# Patient Record
Sex: MALE | Race: AMERICAN INDIAN OR ALASKA NATIVE | HISPANIC OR LATINO | URBAN - METROPOLITAN AREA
[De-identification: names, ages, dates, MRNs, and addresses within clinical notes are randomized per-mention and may not be internally consistent; named-entity substitution may affect disease eponyms.]

---

## 2022-03-17 ENCOUNTER — EMERGENCY (EMERGENCY)
Facility: HOSPITAL | Age: 42
LOS: 1 days | Discharge: HOME | End: 2022-03-17
Attending: EMERGENCY MEDICINE | Admitting: EMERGENCY MEDICINE
Payer: COMMERCIAL

## 2022-03-17 VITALS
SYSTOLIC BLOOD PRESSURE: 137 MMHG | HEART RATE: 67 BPM | DIASTOLIC BLOOD PRESSURE: 83 MMHG | RESPIRATION RATE: 18 BRPM | OXYGEN SATURATION: 99 % | TEMPERATURE: 98 F

## 2022-03-17 VITALS
WEIGHT: 195.11 LBS | OXYGEN SATURATION: 100 % | HEART RATE: 86 BPM | TEMPERATURE: 97 F | DIASTOLIC BLOOD PRESSURE: 95 MMHG | RESPIRATION RATE: 16 BRPM | SYSTOLIC BLOOD PRESSURE: 153 MMHG

## 2022-03-17 LAB
FLUAV AG NPH QL: SIGNIFICANT CHANGE UP
FLUBV AG NPH QL: SIGNIFICANT CHANGE UP
RSV RNA NPH QL NAA+NON-PROBE: SIGNIFICANT CHANGE UP
SARS-COV-2 RNA SPEC QL NAA+PROBE: SIGNIFICANT CHANGE UP

## 2022-03-17 PROCEDURE — 99284 EMERGENCY DEPT VISIT MOD MDM: CPT | Mod: 25

## 2022-03-17 PROCEDURE — 70450 CT HEAD/BRAIN W/O DYE: CPT | Mod: MA

## 2022-03-17 PROCEDURE — 70450 CT HEAD/BRAIN W/O DYE: CPT | Mod: 26,MA

## 2022-03-17 PROCEDURE — 87637 SARSCOV2&INF A&B&RSV AMP PRB: CPT

## 2022-03-17 PROCEDURE — 99284 EMERGENCY DEPT VISIT MOD MDM: CPT

## 2022-03-17 RX ORDER — IBUPROFEN 200 MG
600 TABLET ORAL ONCE
Refills: 0 | Status: COMPLETED | OUTPATIENT
Start: 2022-03-17 | End: 2022-03-17

## 2022-03-17 RX ORDER — METOCLOPRAMIDE HCL 10 MG
10 TABLET ORAL ONCE
Refills: 0 | Status: COMPLETED | OUTPATIENT
Start: 2022-03-17 | End: 2022-03-17

## 2022-03-17 RX ADMIN — Medication 10 MILLIGRAM(S): at 16:23

## 2022-03-17 RX ADMIN — Medication 600 MILLIGRAM(S): at 16:23

## 2022-03-17 NOTE — ED PROVIDER NOTE - CLINICAL SUMMARY MEDICAL DECISION MAKING FREE TEXT BOX
Pt p/w HA, generalized. No focal neuro complaints or deficits. DDx includes tension, cluster, less likely migraine, mass / lesion, other HA NOS. Very low suspicion SAH based on H&P. Pt in no apparent distress. Palpated cyst superficial and unlikely cause. Given new-onset HA's, will get CTH. Analgesia. Advised f/u PCP, neuro.

## 2022-03-17 NOTE — ED PROVIDER NOTE - OBJECTIVE STATEMENT
Pt w/ no sig PMHx, no PSHx p/w HA x 6 days. HA is generalized, pt cannot describe the pain. The pain is constant and not relieved w/ Tylenol. No associated n/v, f/c, vision disturbances, numbness/tingling, weakness. No trauma. He states he does not typically get HA's. He has noticed a lump on his head several months ago, told it was a cyst. He  is concerned it is related. He states his father had a lot of cysts in his head - he cannot describe further, but states his father did not have CA, ICH, nor aneurysm, and is still living. No URI sx. He also reports R eye discomfort. No throat pain. No loss taste/smell. No myalgias. He is vaccinated against COVID19. Pt reports 10/10 but appears comfortable and is no apparent distress

## 2022-03-17 NOTE — ED PROVIDER NOTE - NSFOLLOWUPINSTRUCTIONS_ED_ALL_ED_FT
You were evaluated in the ED (Emergency Department) for a headache. You had a CT of the brain which showed no abnormalities to the brain. You have some inflammation in your sinuses, but no infectious symptoms. You should follow up with your primary medical doctor. If you continue to have headaches, you should see a neurologist. The cyst you feel in your scalp is under the skin, and not adjacent to the brain, and does not require treatment.     You may take over-the-counter Ibuprofen (Motrin or Advil), or Excedrin for pain. You can also apply warm compresses to the area of the cyst.     Fue evaluado en el ED (Departamento de Emergencia) por un dolor de deja. Le hicieron tomi tomografía computarizada del cerebro que no mostró anomalías en el cerebro. Tiene algo de inflamación en los senos paranasales, john no tiene síntomas infecciosos. Debe hacer un seguimiento con xiong médico de cabecera. Si continúa teniendo carlos de deja, debe consultar a un neurólogo. El quiste que siente en el cuero cabelludo está debajo de la piel, no adyacente al cerebro y no requiere tratamiento.    Puede sage ibuprofeno de venta vick (Motrin o Advil) o Excedrin para el dolor. También puede aplicar compresas tibias en el área del quiste.

## 2022-03-17 NOTE — ED PROVIDER NOTE - NSFOLLOWUPCLINICS_GEN_ALL_ED_FT
[facility]  Neurology  [address] [place] [address]  Phone: [phone]  Fax: [phone]

## 2022-03-17 NOTE — ED PROVIDER NOTE - PHYSICAL EXAMINATION
Constitutional: Well appearing, awake, alert, oriented to person, place, time/situation and in no apparent distress.  Head atraumatic, normocephalic. Palpated 1 cm superficial scalp lesion to L posterior parietal / occipital region, non tender, fluctuance. no overlying skin changes  ENMT: Airway patent. Normal MM. TMI b/l  Eyes: Clear bilaterally, PERRL, EOMI. no photophobia  Cardiac: Normal rate, regular rhythm.  Heart sounds S1, S2.  No murmurs, rubs or gallops.  Respiratory: Breaths sounds equal and clear b/l. No increased WOB, tachypnea, hypoxia, or accessory mm use. Pt speaks in full sentences.   Musculoskeletal: Range of motion is not limited. Neck supple, FROM  Neuro: Alert & Oriented x 3. CN II-XII intact. No facial droop. Clear speech. SOLIMAN w/ 5/5 strength x 4 ext. Normal sensation. No pronator drift. Normal gait  Skin: Skin normal color for race, warm, dry and intact. No evidence of rash.  Psych: Alert and oriented to person, place, time/situation. normal mood and affect. no apparent risk to self or others.

## 2022-03-17 NOTE — ED ADULT TRIAGE NOTE - CHIEF COMPLAINT QUOTE
Pt c/o headache x6 days and "bump" to the back of the head x months. Denies falls, injuries, vision changes, vomiting. Ambulating with steady gait. Taking Tylenol with no relief.

## 2022-03-17 NOTE — ED ADULT NURSE NOTE - OBJECTIVE STATEMENT
Patient presents AOX4 c/o L sided HA x 6-7 days. Reports he had his glasses changed x 1 month ago. Denies fevers/chills/nausea/vomiting. Reports lump to posterior head-- reports he saw a doctor and was told he has a cyst. Speaking in full, complete sentences. No slurred speech noted. Answering questions and following verbal commands appropriately.

## 2022-03-17 NOTE — ED PROVIDER NOTE - NS ED ROS FT
Constitutional: No fever or chills.   Eyes: No pain, blurry vision, or discharge.  ENMT: No hearing changes, pain, discharge or infections. No neck pain or stiffness.  Cardiac: No chest pain, SOB or edema. No chest pain with exertion.  Respiratory: No cough or respiratory distress. No hemoptysis. No history of asthma or RAD.  GI: No nausea, vomiting, diarrhea or abdominal pain.  : No dysuria, frequency or burning.  MS: No myalgia, muscle weakness, joint pain or back pain.  Neuro: No focal weakness. No LOC.  Skin: No skin rash.   Endocrine: No history of thyroid disease or diabetes.  Except as documented in the HPI, all other systems are negative.

## 2022-03-17 NOTE — ED PROVIDER NOTE - PROGRESS NOTE DETAILS
D/w pt negative CTH except for sinus inflammation. No f/c, purulent discharge or facial pain to necessitate tx. Advised f/u PCP, and neuro for recurrent HA's.

## 2022-03-17 NOTE — ED PROVIDER NOTE - PATIENT PORTAL LINK FT
You can access the FollowMyHealth Patient Portal offered by [facility] by registering at the following website: [email or web address]. By joining [facility]’s FollowMyHealth portal, you will also be able to view your health information using other applications (apps) compatible with our system.

## 2022-03-18 PROBLEM — Z00.00 ENCOUNTER FOR PREVENTIVE HEALTH EXAMINATION: Status: ACTIVE | Noted: 2022-03-18

## 2022-03-21 DIAGNOSIS — Z20.822 CONTACT WITH AND (SUSPECTED) EXPOSURE TO COVID-19: ICD-10-CM

## 2022-03-21 DIAGNOSIS — R51.9 HEADACHE, UNSPECIFIED: ICD-10-CM

## 2022-04-01 ENCOUNTER — NON-APPOINTMENT (OUTPATIENT)
Age: 42
End: 2022-04-01

## 2022-04-01 ENCOUNTER — APPOINTMENT (OUTPATIENT)
Dept: NEUROLOGY | Facility: CLINIC | Age: 42
End: 2022-04-01
Payer: COMMERCIAL

## 2022-04-01 VITALS
OXYGEN SATURATION: 98 % | HEART RATE: 76 BPM | HEIGHT: 71 IN | TEMPERATURE: 97.7 F | WEIGHT: 197 LBS | DIASTOLIC BLOOD PRESSURE: 88 MMHG | BODY MASS INDEX: 27.58 KG/M2 | SYSTOLIC BLOOD PRESSURE: 132 MMHG

## 2022-04-01 DIAGNOSIS — G44.209 TENSION-TYPE HEADACHE, UNSPECIFIED, NOT INTRACTABLE: ICD-10-CM

## 2022-04-01 PROCEDURE — 99204 OFFICE O/P NEW MOD 45 MIN: CPT

## 2022-04-01 RX ORDER — IBUPROFEN 600 MG/1
600 TABLET, FILM COATED ORAL
Refills: 0 | Status: ACTIVE | COMMUNITY

## 2022-04-01 NOTE — HISTORY OF PRESENT ILLNESS
[FreeTextEntry1] : Presented to ED 3/17 with severe headache and referred by ED for neurology evaluation.\par \par Reports that he has pain in the middle of his forehead, above his nose, and behind his eyes, extending up to the top and back of his head.  No vision changes, photophobia, phonophobia, nausea, vomiting.  Has been taking Tylenol with helps temporarily, but the pain always comes back.  Does not get enough sleep, especially on Fridays, Saturdays, and Sundays when he works until midnight.\par

## 2022-04-01 NOTE — DATA REVIEWED
[de-identified] : Head CT 3.17.2022 independently reviewed and reviewed with patient, unremarkable

## 2022-04-01 NOTE — ASSESSMENT
[FreeTextEntry1] : Tension headache, now with medication overuse component\par \par -Short course of nortriptyline 10 mg QHS to help wean off NSAIDs\par -Ibuprofen max 2-3x/week\par -Importance of sleep reviewed and advised catching up on sleep on days off\par \par RTC PRN

## 2022-04-01 NOTE — PHYSICAL EXAM
[FreeTextEntry1] : Physical Exam\par Constitutional: no apparent distress\par Psychiatric: normal affect, euthymic, alert and oriented x 3\par \par Neurologic Exam:\par Mental Status: awake and alert, speech fluent and prosodic with no paraphasic errors\par Cranial Nerves: I: deferred; II: pupils equal round and reactive III, IV, VI: extraocular movements full with no nystagmus; V: facial sensation intact and symmetric; VII: facial power symmetric; VIII: hearing intact to voice; IX/X: palate elevates symmetrically, no dysarthria; XI: shoulder shrug symmetric; XII: tongue protrudes midline\par Motor: normal bulk and tone, no orbiting or pronator drift, power 5/5 to confrontation throughout including shoulder abduction, elbow flexion and extension, wrist flexion and extension, hip flexion, knee flexion and extension, no abnormal movements\par Sensation: intact to light touch in distal upper and lower extremities bilaterally\par Coordination: finger-nose-finger intact bilaterally\par Reflexes: 2+ biceps, triceps, brachioradialis, patella\par Gait: narrow base, normal stance and stride, normal arm swing\par

## 2022-04-11 ENCOUNTER — APPOINTMENT (OUTPATIENT)
Dept: NEUROLOGY | Facility: CLINIC | Age: 42
End: 2022-04-11
Payer: COMMERCIAL

## 2022-04-11 ENCOUNTER — APPOINTMENT (OUTPATIENT)
Dept: OTOLARYNGOLOGY | Facility: CLINIC | Age: 42
End: 2022-04-11
Payer: COMMERCIAL

## 2022-04-11 VITALS
SYSTOLIC BLOOD PRESSURE: 143 MMHG | HEIGHT: 71 IN | DIASTOLIC BLOOD PRESSURE: 85 MMHG | TEMPERATURE: 97.7 F | HEART RATE: 82 BPM | BODY MASS INDEX: 27.58 KG/M2 | WEIGHT: 197 LBS

## 2022-04-11 PROCEDURE — 31231 NASAL ENDOSCOPY DX: CPT

## 2022-04-11 PROCEDURE — 99204 OFFICE O/P NEW MOD 45 MIN: CPT | Mod: 25

## 2022-04-11 PROCEDURE — 99442: CPT | Mod: 95

## 2022-04-11 RX ORDER — FLUTICASONE PROPIONATE 50 UG/1
50 SPRAY, METERED NASAL DAILY
Qty: 1 | Refills: 2 | Status: ACTIVE | COMMUNITY
Start: 2022-04-11 | End: 1900-01-01

## 2022-04-11 RX ORDER — FLUTICASONE PROPIONATE 50 UG/1
50 SPRAY, METERED NASAL DAILY
Qty: 1 | Refills: 3 | Status: ACTIVE | COMMUNITY
Start: 2022-04-11 | End: 1900-01-01

## 2022-04-11 RX ORDER — NORTRIPTYLINE HYDROCHLORIDE 10 MG/1
10 CAPSULE ORAL
Qty: 30 | Refills: 1 | Status: DISCONTINUED | COMMUNITY
Start: 2022-04-01 | End: 2022-04-11

## 2022-04-11 NOTE — PHYSICAL EXAM
[Nasal Endoscopy Performed] : nasal endoscopy was performed, see procedure section for findings [Midline] : trachea located in midline position [Normal] : no rashes [de-identified] : hypertrophied, worse on left [de-identified] : +

## 2022-04-11 NOTE — ASSESSMENT
[FreeTextEntry1] : Headaches\par \par I initially suspected that these were tension headaches, but I am now more concerned about sinus headaches/possible chronic sinusitis.\par Stop nortriptyline as this is not helping\par Okay to continue ibuprofen for now\par Start Flonase \par ENT referral

## 2022-04-11 NOTE — PROCEDURE
[FreeTextEntry6] : Nasal Endoscopy Procedure Note\par \par Pre-operative Diagnosis: nasal congestion, forehead/occiput pressure\par Post-operative Diagnosis: acute sinusitis\par Anesthesia: Topical\par Procedure: Bilateral nasal endoscopy\par  \par Procedure Details: \par After topical anesthesia and decongestant, the patient was placed in the supine position. The telescope was passed along the left nasal floor to the nasopharynx. It was then passed into the region of the middle meatus, middle turbinate, and the sphenoethmoid region. An identical procedure was performed on the right side. \par  \par Findings: \par Mucosa: 	 mild inflammation\par Nasal septum: normal	\par Discharge: 	clear \par Turbinates: 	hypertrophied\par Adenoid: 	 normal	\par Posterior choanae: 	normal	\par Eustachian tubes: 	normal	\par Mucous stranding: 	normal 	\par Lesions: 	 Not present	\par  \par Comments: \par Condition: Stable. Patient tolerated procedure well.\par

## 2022-04-11 NOTE — HISTORY OF PRESENT ILLNESS
[de-identified] : 41 year old male presents with complaints of forehead and occipital pain/pressure for 1 month and nasal congestion for 3 weeks.  Yesterday when he cleaned his nose with a tissue, he noticed it "had a bad odor." He does complain of a diminished sense of taste and smell for 1 week.  No drainage that he has noticed. No URI, fevers, or dental pain. His symptoms are intermittent throughout the day, worse in the evenings. No other ENT issues. \par \par He went to the ER about 1 month ago because of his headache. They did head CT and was + for mucosal thickening of ethmoid and sphenoid sinuses and also was given a Zpak. Referred to neurology. He was seen and started on Nortriptyline for tension headache, but this is now discontinued. Neurology referred him to us to evaluate possible sinusitis.

## 2022-04-11 NOTE — ASSESSMENT
[FreeTextEntry1] : 41 year old male presents with 1 month of forehead and occipital pain as well as 3 weeks of nasal congestion. About 1 month ago, his symptoms brought him to the ER where a CT head was performed showing mucosal thickening ethmoid and sphenoid sinuses. On exam, there is evidence of bilateral turbinate hypertrophy, left worse than right. His history, exam findings, and imaging studies are consistent with acute sinusitis. I recommend nasal saline twice a day and nasal steroids once a day. In addition, we will order Augmentin for 10 days as well as a Medrol Dosepak. He will follow up with us in 3 weeks or sooner if needed. If his symptoms persists or fail to improve, we will obtain a dedicated CT sinus. \par \par \par \par Plan:\par - nasal saline and nasal steroids\par - Augmentin BID x 10 days\par - Medrol Dospak x 7 days\par - fu in 3 weeks, CT sinus if still symptomatic.

## 2022-04-11 NOTE — HISTORY OF PRESENT ILLNESS
[FreeTextEntry1] : Pain has persisted, nortriptyline not helping at all.  Pain is now mostly in the forehead and nose, feels very congested.  No fevers.  Wants to know if there is anything he can take to help with the congestion.

## 2022-04-11 NOTE — DATA REVIEWED
[de-identified] : CT Head 3/17/22\par Impression:\par No acute intracranial hemorrhage or mass effect.\par Mucosal thickening ethmoid and sphenoid sinuses.

## 2023-01-25 ENCOUNTER — APPOINTMENT (OUTPATIENT)
Dept: ORTHOPEDIC SURGERY | Facility: CLINIC | Age: 43
End: 2023-01-25
Payer: COMMERCIAL

## 2023-01-25 VITALS
HEIGHT: 71 IN | DIASTOLIC BLOOD PRESSURE: 92 MMHG | HEART RATE: 73 BPM | OXYGEN SATURATION: 98 % | SYSTOLIC BLOOD PRESSURE: 133 MMHG | WEIGHT: 197 LBS | BODY MASS INDEX: 27.58 KG/M2

## 2023-01-25 DIAGNOSIS — M54.50 LOW BACK PAIN, UNSPECIFIED: ICD-10-CM

## 2023-01-25 PROCEDURE — 99203 OFFICE O/P NEW LOW 30 MIN: CPT

## 2023-01-25 PROCEDURE — 72083 X-RAY EXAM ENTIRE SPI 4/5 VW: CPT

## 2023-01-27 PROBLEM — M54.50 LOWER BACK PAIN: Status: ACTIVE | Noted: 2023-01-26

## 2023-01-27 NOTE — DISCUSSION/SUMMARY
[de-identified] : Diagnosis: Lower back pain, possible lumbar radiculopathy.\par \par I discussed my findings with the patient. The patient has failed conservative treatment with ibuprofen 600 mg at a prescription dose for an extended period of time. As a result, given his significant symptoms, I have recommended further workup with a MRI Lumbar Spine. He should follow up after this is performed to review and determine plan at that time.

## 2023-01-27 NOTE — REASON FOR VISIT
[Initial Visit] : an initial visit for [Back Pain] : back pain [Interpreters_IDNumber] : [id number] [Interpreters_FullName] : Naveed [TWNoteComboBox1] : [place]

## 2023-01-27 NOTE — HISTORY OF PRESENT ILLNESS
September 3, 2021         Patient: Eduar Stephens   YOB: 2014   Date of Visit: 9/3/2021           To Whom it May Concern:    Eduar tSephens was seen in my clinic on 9/3/2021. He may return to school on 9/7/21.    If you have any questions or concerns, please don't hesitate to call.        Sincerely,           Lennie Field M.D.  Electronically Signed      [de-identified] : Initial Visit 01/24/2023: Mr. Levin reports a longstanding history of low back pain that radiates to his bilateral posterior thighs and circumferentially around his knees. Pain occasionally radiates distally to his calves. He reports significant difficulty ambulating with these symptoms. He has been doing a home exercise program and taking oral NSAIDs without any significant relief.

## 2023-01-27 NOTE — END OF VISIT
[FreeTextEntry3] : All medical record entries made by the Scribe were at my, Dr. Balwinder Ochoa, direction and personally dictated by me on 01/25/2023. I have reviewed the chart and agree that the record accurately reflects my personal performance of the history, physical exam, assessment and plan. I have also personally directed, reviewed, and agreed with the chart.

## 2023-01-27 NOTE — PHYSICAL EXAM
[de-identified] : Physical Exam:\par \par General: patient is well developed, well nourished, in no acute\par distress, alert and oriented x 3.\par \par Mood and affect: normal\par \par Respiratory: no respiratory distress noted\par \par Skin: no scars over spine, skin intact, no erythema, increased warmth\par \par Alignment: The spine is well compensated in the coronal and sagittal plane.\par \par Gait: The patient is able to toe walk and heel walk without difficulty. The patient is able to tandem gait without difficulty.\par \par Palpation: no tenderness to palpation spine or paraspinal region\par \par Range of motion: Lumbar spine ROM is restricted.\par \par Neurologic Exam:\par Motor: Manual Muscle testing in the lower extremities is 5 out of 5 in all muscle groups. There is no evidence of muscular\par atrophy in the lower extremities. Sensory: Sensation to light touch is grossly intact in the lower extremities\par \par Reflexes: DTR are present and symmetric throughout, no clonus, plantar responses are flexor\par \par Hip Exam: No pain with internal or external rotation of hips bilaterally\par \par Special tests: Straight leg raise test negative. Cross straight leg test negative. JAKY test negative\par \par Vascular: Examination of the peripheral vascular system demonstrates no evidence of congestion or edema. no evidence of\par lymphedema bilateral lower extremities, pulses are present and symmetric in both lower extremities. [de-identified] : XR Full Spine AP/Lateral with Lumbar flexion/extension 01/25/2023 [my read]: No significant disc pathology. No instability. No fractures. Hips do not reveal significant arthrosis.

## 2023-01-27 NOTE — ADDENDUM
[FreeTextEntry1] : Documented by Chrissie Castañeda acting as a scribe for Dr. Balwinder Ochoa on 01/26/2023.

## 2023-02-13 ENCOUNTER — APPOINTMENT (OUTPATIENT)
Dept: MRI IMAGING | Facility: HOSPITAL | Age: 43
End: 2023-02-13
Payer: COMMERCIAL

## 2023-02-13 ENCOUNTER — RESULT REVIEW (OUTPATIENT)
Age: 43
End: 2023-02-13

## 2023-02-13 ENCOUNTER — OUTPATIENT (OUTPATIENT)
Dept: OUTPATIENT SERVICES | Facility: HOSPITAL | Age: 43
LOS: 1 days | End: 2023-02-13
Payer: COMMERCIAL

## 2023-02-13 PROCEDURE — 72148 MRI LUMBAR SPINE W/O DYE: CPT

## 2023-02-13 PROCEDURE — 72148 MRI LUMBAR SPINE W/O DYE: CPT | Mod: 26

## 2023-02-15 ENCOUNTER — APPOINTMENT (OUTPATIENT)
Dept: ORTHOPEDIC SURGERY | Facility: CLINIC | Age: 43
End: 2023-02-15
Payer: COMMERCIAL

## 2023-02-15 VITALS
HEIGHT: 71 IN | HEART RATE: 75 BPM | OXYGEN SATURATION: 99 % | SYSTOLIC BLOOD PRESSURE: 152 MMHG | BODY MASS INDEX: 27.58 KG/M2 | DIASTOLIC BLOOD PRESSURE: 98 MMHG | WEIGHT: 197 LBS

## 2023-02-15 DIAGNOSIS — M54.16 RADICULOPATHY, LUMBAR REGION: ICD-10-CM

## 2023-02-15 DIAGNOSIS — M54.6 PAIN IN THORACIC SPINE: ICD-10-CM

## 2023-02-15 PROCEDURE — 99215 OFFICE O/P EST HI 40 MIN: CPT

## 2023-02-15 RX ORDER — MELOXICAM 7.5 MG/1
7.5 TABLET ORAL TWICE DAILY
Qty: 30 | Refills: 1 | Status: ACTIVE | COMMUNITY
Start: 2023-02-15 | End: 1900-01-01

## 2023-02-15 NOTE — HISTORY OF PRESENT ILLNESS
[de-identified] : Bassam Trejo follows up with me at the request of my partner Dr. Ochoa the results of his lumbar MRI.  He again describes ongoing symptoms of low back pain with occasional radiation to the posterior left leg which been present for many years.  He also describes some discomfort regarding the right knee.  Additionally describes some aching pain and stiffness in the mid back without radiation to the upper extremities.  Currently works manual labor and feels that the symptoms have made this more difficult recently.

## 2023-02-15 NOTE — DISCUSSION/SUMMARY
[de-identified] : There are conversations held the patient regarding his condition the natural history all treatment options risk benefits and alternatives.  Given the very active manual nature of his job I recommended a formal course of physical therapy to allow for some manual treatment as well as exercise based intervention regarding his multiple muscular type complaints.  I reassured him that his lumbar spine structurally appears to be in fine condition without clear evidence of pathology that could explain his current symptoms.  I also prescribed meloxicam as an anti-inflammatory alternative to Advil for symptomatic relief.  He will follow-up with Dr. Ochoa on an as-needed basis\par \par I have spent greater than 45 minutes preparing to see the patient, collecting relevant history, performing a thorough history and physical examination, counseling the patient regarding my findings ordering the appropriate therapies and tests, communicating with other relevant healthcare professionals, documenting my encounter and coordinating care.\par

## 2023-02-15 NOTE — PHYSICAL EXAM
[Normal] : Gait: normal [UE/LE] : Sensory: Intact in bilateral upper & lower extremities [de-identified] : MRI lumbar spine 2/13/2023 reviewed in care extreme:\par Relative maintenance of lumbar lordosis.  Minimal early degenerative change L5-S1 with maintenance of disc base height.  There is a relatively small left-sided paracentral disc bulge L5-S1 without significant stenosis.

## 2023-02-15 NOTE — ASSESSMENT
[FreeTextEntry1] : 42-year-old male with chronic low back and bilateral leg pain without evidence of compressive pathology on lumbar MRI, also with cervical thoracic muscular discomfort without clinical evidence of cervical radiculopathy or myelopathy

## 2023-05-02 ENCOUNTER — EMERGENCY (EMERGENCY)
Facility: HOSPITAL | Age: 43
LOS: 1 days | Discharge: HOME | End: 2023-05-02
Attending: EMERGENCY MEDICINE | Admitting: EMERGENCY MEDICINE
Payer: COMMERCIAL

## 2023-05-02 VITALS
OXYGEN SATURATION: 98 % | WEIGHT: 194.01 LBS | HEART RATE: 73 BPM | TEMPERATURE: 99 F | HEIGHT: 71 IN | RESPIRATION RATE: 18 BRPM | SYSTOLIC BLOOD PRESSURE: 137 MMHG | DIASTOLIC BLOOD PRESSURE: 85 MMHG

## 2023-05-02 VITALS
SYSTOLIC BLOOD PRESSURE: 129 MMHG | RESPIRATION RATE: 18 BRPM | OXYGEN SATURATION: 98 % | HEART RATE: 75 BPM | TEMPERATURE: 99 F | DIASTOLIC BLOOD PRESSURE: 81 MMHG

## 2023-05-02 DIAGNOSIS — Z87.19 PERSONAL HISTORY OF OTHER DISEASES OF THE DIGESTIVE SYSTEM: ICD-10-CM

## 2023-05-02 DIAGNOSIS — R11.0 NAUSEA: ICD-10-CM

## 2023-05-02 DIAGNOSIS — R10.13 EPIGASTRIC PAIN: ICD-10-CM

## 2023-05-02 LAB
ALBUMIN SERPL ELPH-MCNC: 4.6 G/DL — SIGNIFICANT CHANGE UP (ref 3.3–5)
ALP SERPL-CCNC: 88 U/L — SIGNIFICANT CHANGE UP (ref 40–120)
ALT FLD-CCNC: 24 U/L — SIGNIFICANT CHANGE UP (ref 10–45)
ANION GAP SERPL CALC-SCNC: 9 MMOL/L — SIGNIFICANT CHANGE UP (ref 5–17)
APPEARANCE UR: CLEAR — SIGNIFICANT CHANGE UP
AST SERPL-CCNC: 19 U/L — SIGNIFICANT CHANGE UP (ref 10–40)
BASOPHILS # BLD AUTO: 0.04 K/UL — SIGNIFICANT CHANGE UP (ref 0–0.2)
BASOPHILS NFR BLD AUTO: 1.1 % — SIGNIFICANT CHANGE UP (ref 0–2)
BILIRUB SERPL-MCNC: 0.3 MG/DL — SIGNIFICANT CHANGE UP (ref 0.2–1.2)
BILIRUB UR-MCNC: NEGATIVE — SIGNIFICANT CHANGE UP
BUN SERPL-MCNC: 11 MG/DL — SIGNIFICANT CHANGE UP (ref 7–23)
CALCIUM SERPL-MCNC: 9.4 MG/DL — SIGNIFICANT CHANGE UP (ref 8.4–10.5)
CHLORIDE SERPL-SCNC: 104 MMOL/L — SIGNIFICANT CHANGE UP (ref 96–108)
CO2 SERPL-SCNC: 27 MMOL/L — SIGNIFICANT CHANGE UP (ref 22–31)
COLOR SPEC: YELLOW — SIGNIFICANT CHANGE UP
CREAT SERPL-MCNC: 1.08 MG/DL — SIGNIFICANT CHANGE UP (ref 0.5–1.3)
DIFF PNL FLD: NEGATIVE — SIGNIFICANT CHANGE UP
EGFR: 88 ML/MIN/1.73M2 — SIGNIFICANT CHANGE UP
EOSINOPHIL # BLD AUTO: 0.2 K/UL — SIGNIFICANT CHANGE UP (ref 0–0.5)
EOSINOPHIL NFR BLD AUTO: 5.7 % — SIGNIFICANT CHANGE UP (ref 0–6)
GLUCOSE SERPL-MCNC: 124 MG/DL — HIGH (ref 70–99)
GLUCOSE UR QL: NEGATIVE — SIGNIFICANT CHANGE UP
HCT VFR BLD CALC: 45.4 % — SIGNIFICANT CHANGE UP (ref 39–50)
HGB BLD-MCNC: 15.7 G/DL — SIGNIFICANT CHANGE UP (ref 13–17)
IMM GRANULOCYTES NFR BLD AUTO: 0.3 % — SIGNIFICANT CHANGE UP (ref 0–0.9)
KETONES UR-MCNC: NEGATIVE — SIGNIFICANT CHANGE UP
LEUKOCYTE ESTERASE UR-ACNC: NEGATIVE — SIGNIFICANT CHANGE UP
LIDOCAIN IGE QN: 46 U/L — SIGNIFICANT CHANGE UP (ref 7–60)
LYMPHOCYTES # BLD AUTO: 1.31 K/UL — SIGNIFICANT CHANGE UP (ref 1–3.3)
LYMPHOCYTES # BLD AUTO: 37.4 % — SIGNIFICANT CHANGE UP (ref 13–44)
MCHC RBC-ENTMCNC: 30.9 PG — SIGNIFICANT CHANGE UP (ref 27–34)
MCHC RBC-ENTMCNC: 34.6 GM/DL — SIGNIFICANT CHANGE UP (ref 32–36)
MCV RBC AUTO: 89.4 FL — SIGNIFICANT CHANGE UP (ref 80–100)
MONOCYTES # BLD AUTO: 0.2 K/UL — SIGNIFICANT CHANGE UP (ref 0–0.9)
MONOCYTES NFR BLD AUTO: 5.7 % — SIGNIFICANT CHANGE UP (ref 2–14)
NEUTROPHILS # BLD AUTO: 1.74 K/UL — LOW (ref 1.8–7.4)
NEUTROPHILS NFR BLD AUTO: 49.8 % — SIGNIFICANT CHANGE UP (ref 43–77)
NITRITE UR-MCNC: NEGATIVE — SIGNIFICANT CHANGE UP
NRBC # BLD: 0 /100 WBCS — SIGNIFICANT CHANGE UP (ref 0–0)
PH UR: 6.5 — SIGNIFICANT CHANGE UP (ref 5–8)
PLATELET # BLD AUTO: 308 K/UL — SIGNIFICANT CHANGE UP (ref 150–400)
POTASSIUM SERPL-MCNC: 4.5 MMOL/L — SIGNIFICANT CHANGE UP (ref 3.5–5.3)
POTASSIUM SERPL-SCNC: 4.5 MMOL/L — SIGNIFICANT CHANGE UP (ref 3.5–5.3)
PROT SERPL-MCNC: 7.2 G/DL — SIGNIFICANT CHANGE UP (ref 6–8.3)
PROT UR-MCNC: NEGATIVE MG/DL — SIGNIFICANT CHANGE UP
RBC # BLD: 5.08 M/UL — SIGNIFICANT CHANGE UP (ref 4.2–5.8)
RBC # FLD: 11.5 % — SIGNIFICANT CHANGE UP (ref 10.3–14.5)
SODIUM SERPL-SCNC: 140 MMOL/L — SIGNIFICANT CHANGE UP (ref 135–145)
SP GR SPEC: 1.02 — SIGNIFICANT CHANGE UP (ref 1–1.03)
UROBILINOGEN FLD QL: 0.2 E.U./DL — SIGNIFICANT CHANGE UP
WBC # BLD: 3.5 K/UL — LOW (ref 3.8–10.5)
WBC # FLD AUTO: 3.5 K/UL — LOW (ref 3.8–10.5)

## 2023-05-02 PROCEDURE — 85025 COMPLETE CBC W/AUTO DIFF WBC: CPT

## 2023-05-02 PROCEDURE — 96374 THER/PROPH/DIAG INJ IV PUSH: CPT | Mod: XU

## 2023-05-02 PROCEDURE — 96375 TX/PRO/DX INJ NEW DRUG ADDON: CPT

## 2023-05-02 PROCEDURE — 99284 EMERGENCY DEPT VISIT MOD MDM: CPT

## 2023-05-02 PROCEDURE — 99284 EMERGENCY DEPT VISIT MOD MDM: CPT | Mod: 25

## 2023-05-02 PROCEDURE — 80053 COMPREHEN METABOLIC PANEL: CPT

## 2023-05-02 PROCEDURE — 81003 URINALYSIS AUTO W/O SCOPE: CPT

## 2023-05-02 PROCEDURE — 74177 CT ABD & PELVIS W/CONTRAST: CPT | Mod: 26,MA

## 2023-05-02 PROCEDURE — 83690 ASSAY OF LIPASE: CPT

## 2023-05-02 PROCEDURE — 36415 COLL VENOUS BLD VENIPUNCTURE: CPT

## 2023-05-02 PROCEDURE — 74177 CT ABD & PELVIS W/CONTRAST: CPT | Mod: MA

## 2023-05-02 RX ORDER — ONDANSETRON 8 MG/1
4 TABLET, FILM COATED ORAL ONCE
Refills: 0 | Status: COMPLETED | OUTPATIENT
Start: 2023-05-02 | End: 2023-05-02

## 2023-05-02 RX ORDER — LIDOCAINE 4 G/100G
10 CREAM TOPICAL ONCE
Refills: 0 | Status: COMPLETED | OUTPATIENT
Start: 2023-05-02 | End: 2023-05-02

## 2023-05-02 RX ORDER — FAMOTIDINE 10 MG/ML
1 INJECTION INTRAVENOUS
Qty: 14 | Refills: 0
Start: 2023-05-02 | End: 2023-05-15

## 2023-05-02 RX ORDER — FAMOTIDINE 10 MG/ML
20 INJECTION INTRAVENOUS ONCE
Refills: 0 | Status: COMPLETED | OUTPATIENT
Start: 2023-05-02 | End: 2023-05-02

## 2023-05-02 RX ORDER — SODIUM CHLORIDE 9 MG/ML
1000 INJECTION INTRAMUSCULAR; INTRAVENOUS; SUBCUTANEOUS ONCE
Refills: 0 | Status: COMPLETED | OUTPATIENT
Start: 2023-05-02 | End: 2023-05-02

## 2023-05-02 RX ADMIN — LIDOCAINE 10 MILLILITER(S): 4 CREAM TOPICAL at 19:02

## 2023-05-02 RX ADMIN — Medication 30 MILLILITER(S): at 19:02

## 2023-05-02 RX ADMIN — FAMOTIDINE 20 MILLIGRAM(S): 10 INJECTION INTRAVENOUS at 18:08

## 2023-05-02 RX ADMIN — ONDANSETRON 4 MILLIGRAM(S): 8 TABLET, FILM COATED ORAL at 18:07

## 2023-05-02 RX ADMIN — SODIUM CHLORIDE 1000 MILLILITER(S): 9 INJECTION INTRAMUSCULAR; INTRAVENOUS; SUBCUTANEOUS at 18:04

## 2023-05-02 NOTE — ED ADULT NURSE NOTE - OBJECTIVE STATEMENT
Patient a+o x4 c/o epigastric pain x 3 days, states occurs after eating. Has taken otc medication with no relief. Maintaining patent airway, denies sob/cp/dizziness/n/v/fever/chills.

## 2023-05-02 NOTE — ED ADULT NURSE NOTE - CHIEF COMPLAINT QUOTE
Pt Indonesian speaking only reports epigastric pain along w decreased PO, diarrhea, and N/V since 3 days. no PMH

## 2023-05-02 NOTE — ED PROVIDER NOTE - CLINICAL SUMMARY MEDICAL DECISION MAKING FREE TEXT BOX
43 M pmh gerd p/w epigastric abd pain x 3 weeks, worse the past few days.  taking nexium w/ out relief.  on exam vss, comfortable appearing, lungs ctab, hrrr, + epigastric ttp, no r/g, no cvat.  likely gastritis vs PUD vs pancreatitis, consider biliary colic.  will obtain labs, CT a/p and give GI cocktail    labs wnl,  CT  shows no acute pathology.  pt feeling better s/p GI cocktail.  will dc w/ pepcid, educated again on diet changes and pt has GI he can f/u with for EGD.  discussed strict return parameters

## 2023-05-02 NOTE — ED PROVIDER NOTE - CARE PROVIDER_API CALL
Tom Huerta (MD)  Gastroenterology; Internal Medicine  [address] [place] [address]  Phone: [phone]  Fax: [phone]  Follow Up Time:     Chloe Bell; MPH)  Gastroenterology; Public Health  Preventative Health  [address] [place] [address]  Phone: [phone]  Fax: [phone]  Follow Up Time:

## 2023-05-02 NOTE — ED PROVIDER NOTE - NSFOLLOWUPINSTRUCTIONS_ED_ALL_ED_FT
Continúe con nexium según lo prescrito  Markle pepcid y evite las comidas picantes y ácidas, el alcohol y el tabaco.    Gastritis en adultos  Gastritis, Adult  Outline of an adult's lower body with a close-up of the stomach, showing inflammation and an ulcer inside the stomach.   La gastritis es la inflamación del estómago. Hay dos tipos de gastritis:  Gastritis aguda. Mercedes tipo aparece de manera repentina.  Gastritis crónica. Mercedes tipo es mucho más frecuente. Se desarrolla lentamente y dura un day tiempo.  La gastritis se manifiesta cuando el revestimiento del estómago se debilita o se lesiona. Sin tratamiento, la gastritis puede causar sangrado y úlceras estomacales.    ¿Cuáles son las causas?  Esta afección puede ser causada por lo siguiente:  Tomi infección.  Beber alcohol en exceso.  Ciertos medicamentos. Estos incluyen corticoesteroides, antibióticos y algunos medicamentos de venta sin receta, radha aspirina o ibuprofeno.  Tener demasiado ácido en el estómago.  Tener tomi enfermedad del estómago.  Algunas otras causas son las siguientes:  Tomi reacción alérgica.  Algunos tratamientos para el cáncer (radiación).  Fumar cigarrillos o usar productos que contengan nicotina o tabaco.  En algunos casos, se desconoce la causa de esta afección.    ¿Qué incrementa el riesgo?  Tener tomi enfermedad de los intestinos.  Tener tomi enfermedad por la cual el propio sistema inmunitario ataca el organismo (enfermedad autoinmunitaria), radha la enfermedad de Crohn.  Usar aspirina o ibuprofeno y otros antiinflamatorios no esteroideos (DOUGLAS) para tratar otras afecciones, radha enfermedades cardíacas o dolor crónico.  Estrés.  ¿Cuáles son los signos o síntomas?  Los síntomas de esta afección incluyen los siguientes:  Dolor o sensación de ardor en la parte superior del abdomen.  Náuseas.  Vómitos.  Sensación molesta de saciedad después de comer.  Pérdida de peso.  Mal aliento.  Jeanette en el vómito o en la materia fecal (heces).  En algunos casos, no hay síntomas.    ¿Cómo se diagnostica?  Esta afección se puede diagnosticar en función de jada antecedentes médicos, un examen físico y pruebas. Las pruebas pueden incluir las siguientes:  Jada antecedentes médicos y tomi descripción de jada síntomas.  Un examen físico.  Pruebas. Estas pueden incluir las siguientes:  Pruebas de jeanette.  Pruebas de heces.  Tomi prueba en la que se introduce un instrumento oconnor y flexible con tomi luiza y tomi pequeña cámara a través del esófago hasta el estómago (endoscopía dariel).  Tomi prueba en la que se patricia tomi muestra de tejido para analizarla con un microscopio (biopsia).  ¿Cómo se trata?  Esta afección se puede tratar con medicamentos. Los medicamentos que se utilizan varían según la causa de la gastritis.  Si la afección se debe a tomi infección bacteriana, pueden recetarle medicamentos antibióticos.  Si la afección se debe al exceso de ácido estomacal, se pueden administrar medicamentos denominados bloqueadores H2, inhibidores de la bomba de protones o antiácidos.  El tratamiento también puede incluir interrumpir el uso de ciertos medicamentos radha aspirina o ibuprofeno y otros antiinflamatorios no esteroideos (DOUGLAS).    Siga estas instrucciones en xiong casa:  Medicamentos    Use los medicamentos de venta vick y los recetados solamente radha se lo haya indicado el médico.  Si le recetaron un antibiótico, tómelo radha se lo haya indicado el médico. No deje de sage el antibiótico aunque comience a sentirse mejor.  Consumo de alcohol    No maggy alcohol si:  Xiong médico le indica no hacerlo.  Está embarazada, puede estar embarazada o está tratando de quedar embarazada.  Si flower alcohol:  Limite xiong uso a las siguientes medidas:  De 0 a 1 medida por día para las mujeres.  De 0 a 2 medidas por día para los hombres.  Sepa cuánta cantidad de alcohol hay en las bebidas que patricia. En los [place], tomi medida equivale a tomi botella de cerveza de 12 oz (355 ml), un vaso de vino de 5 oz (148 ml) o un vaso de tomi bebida alcohólica de dariel graduación de 1½ oz (44 ml).  Instrucciones generales    A comparison of three sample cups showing dark yellow, yellow, and pale yellow urine.  Alin comidas pequeñas y frecuentes gianfranco el día en lugar de comidas abundantes.  Evite los alimentos y las bebidas que intensifican los síntomas.  Hable con el médico sobre las formas de controlar el estrés, radha realizar ejercicio con regularidad o practicar respiración profunda, meditación o yoga.  No consuma ningún producto que contenga nicotina o tabaco. Estos productos incluyen cigarrillos, tabaco para mascar y aparatos de vapeo, radha los cigarrillos electrónicos. Si necesita ayuda para dejar de fumar, consulte al médico.  Maggy suficiente líquido radha para mantener la orina de color amarillo pálido.  Concurra a todas las visitas de seguimiento. Klahr es importante.  Comuníquese con un médico si:  Jada síntomas empeoran.  El dolor abdominal empeora.  Los síntomas regresan después del tratamiento.  Tiene fiebre.  Solicite ayuda de inmediato si:  Vomita jeanette o tomi sustancia parecida a los posos del café.  Las heces son negras o de color correa oscuro.  No puede retener los líquidos.  Estos síntomas pueden representar un problema grave que constituye tomi emergencia. No espere a brenda si los síntomas desaparecen. Solicite atención médica de inmediato. Comuníquese con el servicio de emergencias de xiong localidad (911 en los [place]). No conduzca por jada propios medios hasta el hospital.    Resumen  La gastritis es la inflamación del revestimiento del estómago que puede ocurrir de manera repentina (aguda) o desarrollarse lentamente con el tiempo (crónica).  Esta afección se diagnostica mediante los antecedentes médicos, un examen físico o pruebas.  Esta afección puede tratarse con medicamentos para tratar la infección o medicamentos para reducir la cantidad de ácido en el estómago.  Siga las instrucciones del médico acerca de sage medicamentos, hacer cambios en la dieta y saber cuándo pedir ayuda.  Esta información no tiene radha fin reemplazar el consejo del médico. Asegúrese de hacerle al médico cualquier pregunta que tenga.

## 2023-05-02 NOTE — ED PROVIDER NOTE - OBJECTIVE STATEMENT
43 M pmh gerd p/w epigastric abd pain x 3 weeks, worse the past few days.  pt reports burning pain in epigastric region, wrose after eating and + nausea, one episode nbnb emesis few days ago.  taking nexium for past 3 weeks w/o improvement.  stopped social etoh use 3 weeks ago as well.  denies tobacco/drug use.  denies f/c, HA, dizziness, fainting, chest pain, sob, diarrhea, changes to bowel habits, melena/hematochezia, urinary sxs, trauma.  no prior abd  surgeries

## 2023-05-02 NOTE — ED ADULT TRIAGE NOTE - CHIEF COMPLAINT QUOTE
Pt Persian speaking only reports epigastric pain along w decreased PO, diarrhea, and N/V since 3 days. no PMH

## 2023-05-02 NOTE — ED PROVIDER NOTE - CARE PROVIDERS DIRECT ADDRESSES
[email or web address]

## 2023-05-02 NOTE — ED ADULT NURSE NOTE - NS ED NURSE LEVEL OF CONSCIOUSNESS AFFECT
Labs, urine, MRSA swab and EKG ordered. Please make sure she gets it all done. Please make sure she tells lab that she has > 5 blood test.  I will see her on her apt on 28th. Calm

## 2023-05-02 NOTE — ED PROVIDER NOTE - ATTENDING CONTRIBUTION TO CARE
44 yo PMHx of GERD, epigastric abd pain worse over the last three days, worse after eating. Taking nexium w min relieft, nausea nbnb, no prior abd surg, stopped etoh use, denies drug use. VSS, Well appearing, nad, nc/at, lung cta, heart reg, abd soft,  ext no gross deformity, no gross neuro deficits, epigastric ttp, pendinglabs, symptom control, CT a/p, reassess.

## 2023-05-02 NOTE — ED PROVIDER NOTE - PATIENT PORTAL LINK FT
You can access the FollowMyHealth Patient Portal offered by [facility] by registering at the following website: [email or web address]. By joining [employer]’s FollowMyHealth portal, you will also be able to view your health information using other applications (apps) compatible with our system. Estimated Blood Loss (Cc): minimal

## 2023-05-02 NOTE — ED PROVIDER NOTE - PHYSICAL EXAMINATION
Vitals reviewed  Gen: well appearing, nad, speaking in full sentences  Skin: wwp, no rash/lesions  HEENT: ncat, eomi, mmm  CV: rrr, no audible m/r/g  Resp: symmetrical expansion, ctab, no w/r/r  Abd: nondistended, soft, epigastric ttp, no r/g, no cvat   Ext: FROM throughout, no peripheral edema  Neuro: alert/oriented, no focal deficits, steady gait

## 2023-05-02 NOTE — ED PROVIDER NOTE - NS ED ATTENDING STATEMENT MOD
I have seen and examined this patient and fully participated in the care of this patient as the teaching attending.  The service was shared with the LEIGHA.  I reviewed and verified the documentation and independently performed the documented:

## 2023-05-04 ENCOUNTER — APPOINTMENT (OUTPATIENT)
Dept: GASTROENTEROLOGY | Facility: CLINIC | Age: 43
End: 2023-05-04
Payer: COMMERCIAL

## 2023-05-04 VITALS
WEIGHT: 190.5 LBS | OXYGEN SATURATION: 98 % | DIASTOLIC BLOOD PRESSURE: 81 MMHG | SYSTOLIC BLOOD PRESSURE: 135 MMHG | HEART RATE: 93 BPM | BODY MASS INDEX: 26.67 KG/M2 | TEMPERATURE: 97.6 F | HEIGHT: 71 IN

## 2023-05-04 DIAGNOSIS — R10.13 EPIGASTRIC PAIN: ICD-10-CM

## 2023-05-04 PROCEDURE — 99203 OFFICE O/P NEW LOW 30 MIN: CPT

## 2023-05-04 RX ORDER — PANTOPRAZOLE 40 MG/1
40 TABLET, DELAYED RELEASE ORAL
Qty: 45 | Refills: 0 | Status: ACTIVE | COMMUNITY
Start: 2023-05-04 | End: 1900-01-01

## 2023-05-04 NOTE — ASSESSMENT
[FreeTextEntry1] : 42 yo M with dyspepsia x 3 weeks with partial response to H2 blocker and no alarm features\par \par Black stool reported, though occurring after pepto and hard/formed.  Likely associated with bismuth, particularly as recent Hgb normal after 3 weeks of sx.\par \par -Protonix 40 mg daily x 4-6 weeks\par -OK to use famotidine 40 mg BID qhs over next 1-2 days while initiating PPI\par -Stop bismuth\par -Counseled on lifestyle modifications with respect to GERD\par -While stools favored to be black 2/2 bismuth, will repeat CBC/BMP today to ensure stability. If downtrend or signs of GIB, will refer to ED\par -If stool remains black despite stopping bismuth over next 24 hours, counseled on ED eval\par \par F/u 4 weeks for re-eval.  Consider H. pylori testing/EGD pending response.\par

## 2023-05-04 NOTE — HISTORY OF PRESENT ILLNESS
[FreeTextEntry1] : Interviewed with  (telephone) [phone]\par \par 42 yo M with no known PMHx here today to discuss 3-4 weeks of intermittent abd discomfort, described as 'burning' and usually worse after meals.\par \par Sx began gradually prompting him to try OTC nexium which he found helped somewhat. Took medication PRN. However sx became suddenly worse on 5/2 prompting ED evaluation.  Labs unremarkable without anemia. Nml BUN:Cr.  CT A/P without PO contrast unremarkable except for stool burden and moderate amount of fluid and debris within the stomach.  \par \par Was discharged on famotidine which he feels has helped somewhat, though sx still present.  He has also tried pepto OTC and he noticed his stool became dark, nearly black but formed and hard. Had 2 episodes today which scared him as his aunt said this could be a sign of stomach cancer.  No nausea/vomiting otherwise. No weight loss or early satiety. Denies tarry appearing stool. Does not use NSAIDs on regular basis. Not on anticoag. No significant changes to health prior to sx onset. Occasionally eats late.\par \par Otherwise no weight loss. No family hx of GI malignancy\par Has had prior EGD years ago for similar abd pain, attributed to 'gastritis'\par

## 2023-05-04 NOTE — PHYSICAL EXAM
[No Respiratory Distress] : no respiratory distress [No Acc Muscle Use] : no accessory muscle use [Respiration, Rhythm And Depth] : normal respiratory rhythm and effort [Abdomen Tenderness] : non-tender [No Masses] : no abdominal mass palpated [Abdomen Soft] : soft [] : no hepatosplenomegaly [Normal] : oriented to person, place, and time

## 2023-05-05 PROBLEM — K29.70 GASTRITIS, UNSPECIFIED, WITHOUT BLEEDING: Chronic | Status: ACTIVE | Noted: 2023-05-02

## 2023-05-08 LAB
ANION GAP SERPL CALC-SCNC: 14 MMOL/L
BUN SERPL-MCNC: 11 MG/DL
CALCIUM SERPL-MCNC: 10.2 MG/DL
CHLORIDE SERPL-SCNC: 103 MMOL/L
CO2 SERPL-SCNC: 26 MMOL/L
CREAT SERPL-MCNC: 1.12 MG/DL
EGFR: 84 ML/MIN/1.73M2
GLUCOSE SERPL-MCNC: 137 MG/DL
HCT VFR BLD CALC: 47.6 %
HGB BLD-MCNC: 15.8 G/DL
POTASSIUM SERPL-SCNC: 4.5 MMOL/L
SODIUM SERPL-SCNC: 142 MMOL/L

## 2023-05-24 ENCOUNTER — APPOINTMENT (OUTPATIENT)
Dept: ULTRASOUND IMAGING | Facility: HOSPITAL | Age: 43
End: 2023-05-24
Payer: COMMERCIAL

## 2023-05-24 ENCOUNTER — OUTPATIENT (OUTPATIENT)
Dept: OUTPATIENT SERVICES | Facility: HOSPITAL | Age: 43
LOS: 1 days | End: 2023-05-24
Payer: COMMERCIAL

## 2023-05-24 PROCEDURE — 76700 US EXAM ABDOM COMPLETE: CPT | Mod: 26

## 2023-05-24 PROCEDURE — 76700 US EXAM ABDOM COMPLETE: CPT

## 2023-06-01 ENCOUNTER — APPOINTMENT (OUTPATIENT)
Dept: GASTROENTEROLOGY | Facility: CLINIC | Age: 43
End: 2023-06-01

## 2023-06-02 NOTE — ED ADULT TRIAGE NOTE - AS HEIGHT TYPE
Problem: Discharge Planning  Goal: Discharge to home or other facility with appropriate resources  6/2/2023 0902 by Fausto Barros RN  Outcome: Progressing  Plans for discharge today. Home with home care. Daughter will pick patient up. stated

## 2023-06-15 ENCOUNTER — RX RENEWAL (OUTPATIENT)
Age: 43
End: 2023-06-15

## 2023-08-22 ENCOUNTER — APPOINTMENT (OUTPATIENT)
Dept: CT IMAGING | Facility: HOSPITAL | Age: 43
End: 2023-08-22

## 2023-11-14 NOTE — ED PROVIDER NOTE - INCLUDE COVID-19 DISCHARGE INSTRUCTIONS
Dad called, they left the Cefalexin you prescribed yesterday out overnight and pharmacist told them they will need a new rx.  Please call into [facility]. Thx!   <-------- Click here to INCLUDE CoVID-19 Discharge Instructions

## 2024-03-04 ENCOUNTER — APPOINTMENT (OUTPATIENT)
Dept: OTOLARYNGOLOGY | Facility: CLINIC | Age: 44
End: 2024-03-04
Payer: COMMERCIAL

## 2024-03-04 VITALS
WEIGHT: 190 LBS | SYSTOLIC BLOOD PRESSURE: 132 MMHG | HEART RATE: 88 BPM | BODY MASS INDEX: 26.6 KG/M2 | OXYGEN SATURATION: 98 % | TEMPERATURE: 97.8 F | HEIGHT: 71 IN | DIASTOLIC BLOOD PRESSURE: 93 MMHG

## 2024-03-04 DIAGNOSIS — J01.90 ACUTE SINUSITIS, UNSPECIFIED: ICD-10-CM

## 2024-03-04 PROCEDURE — 31231 NASAL ENDOSCOPY DX: CPT

## 2024-03-04 PROCEDURE — 99215 OFFICE O/P EST HI 40 MIN: CPT | Mod: 25

## 2024-03-04 RX ORDER — FLUTICASONE PROPIONATE 50 UG/1
50 SPRAY, METERED NASAL DAILY
Qty: 2 | Refills: 3 | Status: ACTIVE | COMMUNITY
Start: 2024-03-04 | End: 1900-01-01

## 2024-03-04 RX ORDER — AMOXICILLIN AND CLAVULANATE POTASSIUM 875; 125 MG/1; MG/1
875-125 TABLET, COATED ORAL
Qty: 20 | Refills: 0 | Status: ACTIVE | COMMUNITY
Start: 2022-04-11 | End: 1900-01-01

## 2024-03-04 RX ORDER — METHYLPREDNISOLONE 4 MG/1
4 TABLET ORAL
Qty: 1 | Refills: 0 | Status: ACTIVE | COMMUNITY
Start: 2022-04-11 | End: 1900-01-01

## 2024-03-04 NOTE — DATA REVIEWED
[de-identified] : CT Head 3/17/22\par  Impression:\par  No acute intracranial hemorrhage or mass effect.\par  Mucosal thickening ethmoid and sphenoid sinuses.

## 2024-03-04 NOTE — HISTORY OF PRESENT ILLNESS
[de-identified] : - 41 year old male presents with complaints of forehead and occipital pain/pressure for 1 month and nasal congestion for 3 weeks.  Yesterday when he cleaned his nose with a tissue, he noticed it "had a bad odor." He does complain of a diminished sense of taste and smell for 1 week.  No drainage that he has noticed. No URI, fevers, or dental pain. His symptoms are intermittent throughout the day, worse in the evenings. No other ENT issues.   He went to the ER about 1 month ago because of his headache. They did head CT and was + for mucosal thickening of ethmoid and sphenoid sinuses and also was given a Zpak. Referred to neurology. He was seen and started on Nortriptyline for tension headache, but this is now discontinued. Neurology referred him to us to evaluate possible sinusitis. - [FreeTextEntry1] : 3/4/24: Patient previously seen for sinusitis presents for repeat evaluation.  Today he notes pain in the head x 2-3 weeks.  No issues with congestion.  No change in smell or taste.  No facial pain or dental pain.  Sometimes with frontal and occipital pain.  He has not tried much in the way of treatment.  Has been using afrin x 7 days.

## 2024-03-04 NOTE — PHYSICAL EXAM
[Nasal Endoscopy Performed] : nasal endoscopy was performed, see procedure section for findings [Midline] : trachea located in midline position [Normal] : no rashes [de-identified] : hypertrophied, worse on left

## 2024-03-04 NOTE — ASSESSMENT
[FreeTextEntry1] : - 41 year old male presents with 1 month of forehead and occipital pain as well as 3 weeks of nasal congestion. About 1 month ago, his symptoms brought him to the ER where a CT head was performed showing mucosal thickening ethmoid and sphenoid sinuses. On exam, there is evidence of bilateral turbinate hypertrophy, left worse than right. His history, exam findings, and imaging studies are consistent with acute sinusitis. I recommend nasal saline twice a day and nasal steroids once a day. In addition, we will order Augmentin for 10 days as well as a Medrol Dosepak. He will follow up with us in 3 weeks or sooner if needed. If his symptoms persists or fail to improve, we will obtain a dedicated CT sinus.   3/4/24: Patient with 2 to 3-week history of frontal sinus and occipital pain.  Examination does show some inflammation.  I am recommending Augmentin, Medrol Dosepak, nasal saline nasal steroids.  I also recommended avoiding Afrin.  Follow-up in 2 to 3 weeks for repeat evaluation.  If symptoms are persisting consider CT sinus for further evaluation.   Plan: - nasal saline and nasal steroids - Augmentin BID x 10 days - Medrol Dospak x 7 days - fu in 3 weeks, CT sinus if still symptomatic.

## 2024-03-26 ENCOUNTER — APPOINTMENT (OUTPATIENT)
Dept: OTOLARYNGOLOGY | Facility: CLINIC | Age: 44
End: 2024-03-26
Payer: COMMERCIAL

## 2024-03-26 VITALS
OXYGEN SATURATION: 98 % | BODY MASS INDEX: 26.6 KG/M2 | DIASTOLIC BLOOD PRESSURE: 87 MMHG | WEIGHT: 190 LBS | HEIGHT: 71 IN | SYSTOLIC BLOOD PRESSURE: 131 MMHG | TEMPERATURE: 98 F | HEART RATE: 85 BPM

## 2024-03-26 DIAGNOSIS — R09.81 NASAL CONGESTION: ICD-10-CM

## 2024-03-26 DIAGNOSIS — J34.3 HYPERTROPHY OF NASAL TURBINATES: ICD-10-CM

## 2024-03-26 DIAGNOSIS — J34.89 OTHER SPECIFIED DISORDERS OF NOSE AND NASAL SINUSES: ICD-10-CM

## 2024-03-26 PROCEDURE — 31231 NASAL ENDOSCOPY DX: CPT

## 2024-03-26 PROCEDURE — 99215 OFFICE O/P EST HI 40 MIN: CPT | Mod: 25

## 2024-03-26 NOTE — ASSESSMENT
[FreeTextEntry1] : - 41 year old male presents with 1 month of forehead and occipital pain as well as 3 weeks of nasal congestion. About 1 month ago, his symptoms brought him to the ER where a CT head was performed showing mucosal thickening ethmoid and sphenoid sinuses. On exam, there is evidence of bilateral turbinate hypertrophy, left worse than right. His history, exam findings, and imaging studies are consistent with acute sinusitis. I recommend nasal saline twice a day and nasal steroids once a day. In addition, we will order Augmentin for 10 days as well as a Medrol Dosepak. He will follow up with us in 3 weeks or sooner if needed. If his symptoms persists or fail to improve, we will obtain a dedicated CT sinus.   3/4/24: Patient with 2 to 3-week history of frontal sinus and occipital pain.  Examination does show some inflammation.  I am recommending Augmentin, Medrol Dosepak, nasal saline nasal steroids.  I also recommended avoiding Afrin.  Follow-up in 2 to 3 weeks for repeat evaluation.  If symptoms are persisting consider CT sinus for further evaluation.  3/26/24: Patient with improvement in terms of symptoms after treatment as described above.  That being said not completely back to baseline.  Still with frontal headache.  At this time I recommended continuing with the nasal saline and nasal steroids.  Recommending CT sinus for further evaluation.  Patient to follow-up after to review those results and discuss neck steps.  Plan: - nasal saline and nasal steroids -CT sinus - fu in 3 weeks,

## 2024-03-26 NOTE — PHYSICAL EXAM
[Nasal Endoscopy Performed] : nasal endoscopy was performed, see procedure section for findings [Midline] : trachea located in midline position [Normal] : no rashes [de-identified] : hypertrophied, worse on left

## 2024-03-26 NOTE — PROCEDURE
[FreeTextEntry6] : Nasal Endoscopy Procedure Note  Pre-operative Diagnosis: nasal congestion, forehead/occiput pressure Post-operative Diagnosis: acute sinusitis Anesthesia: Topical Procedure: Bilateral nasal endoscopy   Procedure Details:  After topical anesthesia and decongestant, the patient was placed in the supine position. The telescope was passed along the left nasal floor to the nasopharynx. It was then passed into the region of the middle meatus, middle turbinate, and the sphenoethmoid region. An identical procedure was performed on the right side.    Findings:  Mucosa: 	 mild inflammation Nasal septum: normal	 Discharge: 	clear  Turbinates: 	hypertrophied Adenoid: 	 normal	 Posterior choanae: 	normal	 Eustachian tubes: 	normal	 Mucous stranding: 	normal 	 Lesions: 	 Not present	   Comments:  Condition: Stable. Patient tolerated procedure well.

## 2024-03-26 NOTE — DATA REVIEWED
[de-identified] : CT Head 3/17/22\par  Impression:\par  No acute intracranial hemorrhage or mass effect.\par  Mucosal thickening ethmoid and sphenoid sinuses.

## 2024-03-26 NOTE — HISTORY OF PRESENT ILLNESS
[de-identified] : - 41 year old male presents with complaints of forehead and occipital pain/pressure for 1 month and nasal congestion for 3 weeks.  Yesterday when he cleaned his nose with a tissue, he noticed it "had a bad odor." He does complain of a diminished sense of taste and smell for 1 week.  No drainage that he has noticed. No URI, fevers, or dental pain. His symptoms are intermittent throughout the day, worse in the evenings. No other ENT issues.   He went to the ER about 1 month ago because of his headache. They did head CT and was + for mucosal thickening of ethmoid and sphenoid sinuses and also was given a Zpak. Referred to neurology. He was seen and started on Nortriptyline for tension headache, but this is now discontinued. Neurology referred him to us to evaluate possible sinusitis. - 3/4/24: Patient previously seen for sinusitis presents for repeat evaluation.  Today he notes pain in the head x 2-3 weeks.  No issues with congestion.  No change in smell or taste.  No facial pain or dental pain.  Sometimes with frontal and occipital pain.  He has not tried much in the way of treatment.  Has been using afrin x 7 days. - [FreeTextEntry1] : 3/26/24: Patient finished Augmentin and Medrol dose pack and has been using nasal saline/ nasal steroids and notes some improvement in terms of symptoms.  That being said he still has some frontal headache at times.  No new ear, nose, throat symptoms otherwise.

## 2024-04-15 ENCOUNTER — APPOINTMENT (OUTPATIENT)
Dept: CT IMAGING | Facility: HOSPITAL | Age: 44
End: 2024-04-15

## 2024-04-16 ENCOUNTER — APPOINTMENT (OUTPATIENT)
Dept: OTOLARYNGOLOGY | Facility: CLINIC | Age: 44
End: 2024-04-16

## 2024-04-16 NOTE — HISTORY OF PRESENT ILLNESS
[de-identified] : - 41 year old male presents with complaints of forehead and occipital pain/pressure for 1 month and nasal congestion for 3 weeks.  Yesterday when he cleaned his nose with a tissue, he noticed it "had a bad odor." He does complain of a diminished sense of taste and smell for 1 week.  No drainage that he has noticed. No URI, fevers, or dental pain. His symptoms are intermittent throughout the day, worse in the evenings. No other ENT issues.   He went to the ER about 1 month ago because of his headache. They did head CT and was + for mucosal thickening of ethmoid and sphenoid sinuses and also was given a Zpak. Referred to neurology. He was seen and started on Nortriptyline for tension headache, but this is now discontinued. Neurology referred him to us to evaluate possible sinusitis. - 3/4/24: Patient previously seen for sinusitis presents for repeat evaluation.  Today he notes pain in the head x 2-3 weeks.  No issues with congestion.  No change in smell or taste.  No facial pain or dental pain.  Sometimes with frontal and occipital pain.  He has not tried much in the way of treatment.  Has been using afrin x 7 days. - 3/26/24: Patient finished Augmentin and Medrol dose pack and has been using nasal saline/ nasal steroids and notes some improvement in terms of symptoms.  That being said he still has some frontal headache at times.  No new ear, nose, throat symptoms otherwise. - [FreeTextEntry1] : 4/16/24: Patient completed CT sinuses and presents to review results. He has been using nasal saline and nasal steroids and notes

## 2024-04-16 NOTE — PHYSICAL EXAM
Iliac   [Nasal Endoscopy Performed] : nasal endoscopy was performed, see procedure section for findings [Midline] : trachea located in midline position [Normal] : no rashes [de-identified] : hypertrophied, worse on left

## 2024-04-16 NOTE — ASSESSMENT
[FreeTextEntry1] : - 41 year old male presents with 1 month of forehead and occipital pain as well as 3 weeks of nasal congestion. About 1 month ago, his symptoms brought him to the ER where a CT head was performed showing mucosal thickening ethmoid and sphenoid sinuses. On exam, there is evidence of bilateral turbinate hypertrophy, left worse than right. His history, exam findings, and imaging studies are consistent with acute sinusitis. I recommend nasal saline twice a day and nasal steroids once a day. In addition, we will order Augmentin for 10 days as well as a Medrol Dosepak. He will follow up with us in 3 weeks or sooner if needed. If his symptoms persists or fail to improve, we will obtain a dedicated CT sinus.   3/4/24: Patient with 2 to 3-week history of frontal sinus and occipital pain.  Examination does show some inflammation.  I am recommending Augmentin, Medrol Dosepak, nasal saline nasal steroids.  I also recommended avoiding Afrin.  Follow-up in 2 to 3 weeks for repeat evaluation.  If symptoms are persisting consider CT sinus for further evaluation.  3/26/24: Patient with improvement in terms of symptoms after treatment as described above.  That being said not completely back to baseline.  Still with frontal headache.  At this time I recommended continuing with the nasal saline and nasal steroids.  Recommending CT sinus for further evaluation.  Patient to follow-up after to review those results and discuss neck steps.  4/16/24:  Plan: - nasal saline and nasal steroids -CT sinus - fu in 3 weeks,

## 2024-04-16 NOTE — DATA REVIEWED
[de-identified] : CT Head 3/17/22\par  Impression:\par  No acute intracranial hemorrhage or mass effect.\par  Mucosal thickening ethmoid and sphenoid sinuses.

## 2025-07-09 NOTE — ED ADULT NURSE NOTE - NSSEPSISSUSPECTED_ED_A_ED
No significant past surgical history
09-Jul-2025 19:30
No
Mitral valve repair possible replacement, possible CABG